# Patient Record
Sex: FEMALE | Race: WHITE
[De-identification: names, ages, dates, MRNs, and addresses within clinical notes are randomized per-mention and may not be internally consistent; named-entity substitution may affect disease eponyms.]

---

## 2017-02-09 ENCOUNTER — HOSPITAL ENCOUNTER (EMERGENCY)
Dept: HOSPITAL 17 - NEPB | Age: 37
Discharge: HOME | End: 2017-02-09
Payer: SELF-PAY

## 2017-02-09 VITALS
DIASTOLIC BLOOD PRESSURE: 72 MMHG | OXYGEN SATURATION: 99 % | HEART RATE: 86 BPM | SYSTOLIC BLOOD PRESSURE: 168 MMHG | RESPIRATION RATE: 14 BRPM | TEMPERATURE: 97.9 F

## 2017-02-09 VITALS — WEIGHT: 231.49 LBS | BODY MASS INDEX: 38.57 KG/M2 | HEIGHT: 65 IN

## 2017-02-09 DIAGNOSIS — L03.116: Primary | ICD-10-CM

## 2017-02-09 PROCEDURE — 96372 THER/PROPH/DIAG INJ SC/IM: CPT

## 2017-02-09 NOTE — PD
HPI


Chief Complaint:  Skin Problem


Time Seen by Provider:  16:40


Travel History


International Travel<30 days:  No


Contact w/Intl Traveler<30days:  No


Traveled to known affect area:  No





History of Present Illness


HPI


36-year-old female presents the emergency department with erythema and swelling 

over the left dorsal foot after receiving a tattoo the area 2 days ago.  

Patient is up-to-date on her tetanus as of last .  Patient denies fever, 

chills, or other symptoms.  She has no history of MRSA in the past.  She is 

allergic to Toradol.  Her pain is 6/10.





PFSH


Past Medical History


Arthritis:  No


Asthma:  No


Anxiety:  Yes


Heart Rhythm Problems:  No


Cardiac Catheterization:  No


Cardiovascular Problems:  No


High Cholesterol:  No


Chest Pain:  No


Congestive Heart Failure:  No


COPD:  No


Cerebrovascular Accident:  No


Diabetes:  No


Diminished Hearing:  No


Deep Vein Thrombosis:  Yes


Endocrine:  No


Gastrointestinal Disorders:  Yes (CELIAC DISEASE)


GERD:  Yes


Glaucoma:  No


Genitourinary:  No


Headaches:  No


Hepatitis:  Yes (HEP C)


Hiatal Hernia:  No


Hypertension:  No


Kidney Stones:  No


Musculoskeletal:  No


Neurologic:  No


Reproductive:  No


Respiratory:  No


Immunizations Current:  Yes


Myocardial Infarction:  No


Renal Failure:  No


Seizures:  No


Sleep Apnea:  No


Thyroid Disease:  No


Ulcer:  Yes


Pregnant?:  Not Pregnant


Menopausal:  No


:  2


Para:  2


Miscarriage:  0


Ovarian Cysts:  Yes (RIGHT )


Tubal Ligation:  Yes





Past Surgical History


AICD:  No


Cardiac Surgery:  No


Cholecystectomy:  Yes


Coronary Artery Bypass Graft:  No


Ear Surgery:  Yes


Endocrine Surgery:  No


Eye Surgery:  No


Genitourinary Surgery:  Yes (COLONOSCOPY )


Gynecologic Surgery:  Yes (TUBAL LIGATION)


Hysterectomy:  Yes


Oral Surgery:  No


Pacemaker:  No


Thoracic Surgery:  No


Tympanostomy Tube:  Yes


Other Surgery:  Yes (choleystestectomy)





Social History


Alcohol Use:  Yes (SOCIALLY)


Tobacco Use:  No


Substance Use:  No





Allergies-Medications


(Allergen,Severity, Reaction):  


Coded Allergies:  


     Toradol (Verified  Adverse Reaction, Severe, DIAPHORETIC, N/V, 17)


Reported Meds & Prescriptions





Reported Meds & Active Scripts


Active








Review of Systems


Except as stated in HPI:  all other systems reviewed are Neg


General / Constitutional:  No: Fever


Eyes:  No: Visual changes


HENT:  No: Headaches


Cardiovascular:  No: Chest Pain or Discomfort


Respiratory:  No: Shortness of Breath


Gastrointestinal:  No: Abdominal Pain


Genitourinary:  No: Dysuria


Musculoskeletal:  Positive: Myalgias,  No: Pain


Skin:  Positive Lesions (see history present illness.), No Rash


Neurologic:  No: Weakness


Psychiatric:  No: Depression


Endocrine:  No: Polydipsia


Hematologic/Lymphatic:  No: Easy Bruising





Physical Exam


Narrative


GENERAL: Patient is in mild to moderate distress.


SKIN: Warm and dry.  Patient has normal color.  Normal turgor.  Patient has a 

new tattoo to the left dorsal lateral foot with some follow with her dragonfly.

  This area is mildly erythematous with swelling to the dorsal foot without 

significant signs of drainage, or lymphangitis.  Left Toes are normal in 

appearance.  The left ankle is normal in appearance.


HEAD: Atraumatic. Normocephalic. 


EYES: Pupils equal and round. No scleral icterus. No injection or drainage. 


ENT: No nasal bleeding or discharge.  Mucous membranes pink and moist.  Pharynx 

is normal.


NECK: Trachea midline.  Supple nontender.


CARDIOVASCULAR: Regular rate and rhythm.  


RESPIRATORY: No accessory muscle use. Clear to auscultation. Breath sounds 

equal bilaterally. 


MUSCULOSKELETAL: Extremities without clubbing, cyanosis, or edema. No obvious 

deformities. SEE SKIN


NEUROLOGICAL: Awake and alert. No obvious cranial nerve deficits.  Motor 

grossly within normal limits. Five out of 5 muscle strength in the arms and 

legs.  Normal speech.


PSYCHIATRIC: Appropriate mood and affect; insight and judgment normal.





Data


Data


Last Documented VS





Vital Signs








  Date Time  Temp Pulse Resp B/P Pulse Ox O2 Delivery O2 Flow Rate FiO2


 


17 16:06 97.9 86 14 168/72 99 Room Air  








Orders





 Clindamycin Inj (Cleocin Inj) (17 16:45)








[facility]


Medical Decision Making


Medical Screen Exam Complete:  Yes


Emergency Medical Condition:  Yes


Differential Diagnosis


Cellulitis left foot.  Infected tattoo.  Early cellulitis.  Possible MRSA.


Narrative Course


Patient is medically stable at time of exam.


There is no real drainage to culture.


Patient is given 600 mg clindamycin IM.


Patient was discharged home on Bactrim DS twice a day 7 days.


Patient is given topical Bactroban to be applied to the area twice daily after 

cleansing with water and soap.


Patient is to keep the area elevated and take ibuprofen as needed for pain.


Patient is given a note for work.


Patient should follow with her primary care physician or return to emergency 

department worsening symptoms as needed.





Diagnosis





 Primary Impression:  


 Cellulitis of left foot excluding toes


Referrals:  


Primary Care Physician


Patient Instructions:  Cellulitis (ED), General Instructions


Departure Forms:  Work Release   Enter return to work date:  2017





***Additional Instructions:


Patient is given 600 mg clindamycin IM.


Patient was discharged home on Bactrim DS twice a day 7 days.


Patient is given topical Bactroban to be applied to the area twice daily after 

cleansing with water and soap.


Patient is to keep the area elevated and take ibuprofen as needed for pain.


Patient is given a note for work.


Patient should follow with her primary care physician or return to emergency 

department worsening symptoms as needed.


***Med/Other Pt SpecificInfo:  Prescription(s) given


Disposition:   DISCHARGE HOME


Condition:  Stable








Brayan Brown 2017 16:41

## 2017-07-23 ENCOUNTER — HOSPITAL ENCOUNTER (EMERGENCY)
Dept: HOSPITAL 17 - PHED | Age: 37
LOS: 1 days | Discharge: HOME | End: 2017-07-24
Payer: COMMERCIAL

## 2017-07-23 VITALS — WEIGHT: 246.26 LBS | BODY MASS INDEX: 41.03 KG/M2 | HEIGHT: 65 IN

## 2017-07-23 VITALS
RESPIRATION RATE: 17 BRPM | SYSTOLIC BLOOD PRESSURE: 129 MMHG | OXYGEN SATURATION: 100 % | DIASTOLIC BLOOD PRESSURE: 70 MMHG | HEART RATE: 90 BPM

## 2017-07-23 VITALS
SYSTOLIC BLOOD PRESSURE: 141 MMHG | HEART RATE: 79 BPM | RESPIRATION RATE: 20 BRPM | TEMPERATURE: 98.2 F | OXYGEN SATURATION: 100 % | DIASTOLIC BLOOD PRESSURE: 90 MMHG

## 2017-07-23 VITALS
DIASTOLIC BLOOD PRESSURE: 60 MMHG | HEART RATE: 65 BPM | OXYGEN SATURATION: 100 % | SYSTOLIC BLOOD PRESSURE: 102 MMHG | RESPIRATION RATE: 18 BRPM

## 2017-07-23 DIAGNOSIS — N93.9: ICD-10-CM

## 2017-07-23 DIAGNOSIS — Z86.718: ICD-10-CM

## 2017-07-23 DIAGNOSIS — R10.30: Primary | ICD-10-CM

## 2017-07-23 DIAGNOSIS — Z87.19: ICD-10-CM

## 2017-07-23 DIAGNOSIS — R11.2: ICD-10-CM

## 2017-07-23 DIAGNOSIS — Z86.59: ICD-10-CM

## 2017-07-23 LAB
ALP SERPL-CCNC: 127 U/L (ref 45–117)
ALT SERPL-CCNC: 28 U/L (ref 10–53)
ANION GAP SERPL CALC-SCNC: 8 MEQ/L (ref 5–15)
APTT BLD: 25.9 SEC (ref 24.3–30.1)
AST SERPL-CCNC: 24 U/L (ref 15–37)
BACTERIA #/AREA URNS HPF: (no result) /HPF
BASOPHILS # BLD AUTO: 0.1 TH/MM3 (ref 0–0.2)
BASOPHILS NFR BLD: 1.8 % (ref 0–2)
BILIRUB SERPL-MCNC: 0.2 MG/DL (ref 0.2–1)
BUN SERPL-MCNC: 11 MG/DL (ref 7–18)
CHLORIDE SERPL-SCNC: 106 MEQ/L (ref 98–107)
COLOR UR: YELLOW
COMMENT (UR): (no result)
CULTURE IF INDICATED: (no result)
EOSINOPHIL # BLD: 0.2 TH/MM3 (ref 0–0.4)
EOSINOPHIL NFR BLD: 2 % (ref 0–4)
ERYTHROCYTE [DISTWIDTH] IN BLOOD BY AUTOMATED COUNT: 15.4 % (ref 11.6–17.2)
GFR SERPLBLD BASED ON 1.73 SQ M-ARVRAT: 101 ML/MIN (ref 89–?)
GLUCOSE UR STRIP-MCNC: (no result) MG/DL
HCO3 BLD-SCNC: 24.8 MEQ/L (ref 21–32)
HCT VFR BLD CALC: 32.9 % (ref 35–46)
HEMO FLAGS: (no result)
HGB UR QL STRIP: (no result)
INR PPP: 1 RATIO
KETONES UR STRIP-MCNC: (no result) MG/DL
LEUKOCYTE ESTERASE UR QL STRIP: (no result) /HPF (ref 0–5)
LYMPHOCYTES # BLD AUTO: 2.8 TH/MM3 (ref 1–4.8)
LYMPHOCYTES NFR BLD AUTO: 34.3 % (ref 9–44)
MCH RBC QN AUTO: 24 PG (ref 27–34)
MCHC RBC AUTO-ENTMCNC: 32.2 % (ref 32–36)
MCV RBC AUTO: 74.5 FL (ref 80–100)
MONOCYTES NFR BLD: 8.8 % (ref 0–8)
MUCOUS THREADS #/AREA URNS LPF: (no result) /LPF
NEUTROPHILS # BLD AUTO: 4.3 TH/MM3 (ref 1.8–7.7)
NEUTROPHILS NFR BLD AUTO: 53.1 % (ref 16–70)
NITRITE UR QL STRIP: (no result)
PLAT MORPH BLD: NORMAL
PLATELET # BLD: 258 TH/MM3 (ref 150–450)
PLATELET BLD QL SMEAR: NORMAL
POTASSIUM SERPL-SCNC: 3.6 MEQ/L (ref 3.5–5.1)
PROTHROMBIN TIME: 10.7 SEC (ref 9.8–11.6)
RBC # BLD AUTO: 4.42 MIL/MM3 (ref 4–5.3)
RBC #/AREA URNS HPF: (no result) /HPF (ref 0–3)
SCAN/DIFF: (no result)
SODIUM SERPL-SCNC: 139 MEQ/L (ref 136–145)
SP GR UR STRIP: 1.02 (ref 1–1.03)
SQUAMOUS #/AREA URNS HPF: (no result) /HPF (ref 0–5)
WBC # BLD AUTO: 8.1 TH/MM3 (ref 4–11)

## 2017-07-23 PROCEDURE — 85730 THROMBOPLASTIN TIME PARTIAL: CPT

## 2017-07-23 PROCEDURE — 74176 CT ABD & PELVIS W/O CONTRAST: CPT

## 2017-07-23 PROCEDURE — 85610 PROTHROMBIN TIME: CPT

## 2017-07-23 PROCEDURE — 99285 EMERGENCY DEPT VISIT HI MDM: CPT

## 2017-07-23 PROCEDURE — 87591 N.GONORRHOEAE DNA AMP PROB: CPT

## 2017-07-23 PROCEDURE — 81001 URINALYSIS AUTO W/SCOPE: CPT

## 2017-07-23 PROCEDURE — 87491 CHLMYD TRACH DNA AMP PROBE: CPT

## 2017-07-23 PROCEDURE — 85025 COMPLETE CBC W/AUTO DIFF WBC: CPT

## 2017-07-23 PROCEDURE — 80053 COMPREHEN METABOLIC PANEL: CPT

## 2017-07-23 PROCEDURE — 87210 SMEAR WET MOUNT SALINE/INK: CPT

## 2017-07-23 PROCEDURE — 96372 THER/PROPH/DIAG INJ SC/IM: CPT

## 2017-07-23 PROCEDURE — 87086 URINE CULTURE/COLONY COUNT: CPT

## 2017-07-23 NOTE — RADRPT
EXAM DATE/TIME:  07/23/2017 22:52 

 

[facility] COMPARISON:     

CT PULMONARY ANGIOGRAM, May 01, 2016, 20:34.

 

 

INDICATIONS :     

Lower abdominal pain post hysterectomy in October 2016. Cramping for three days.

                  

 

ORAL CONTRAST:      

No oral contrast ingested.

                  

 

RADIATION DOSE:     

22.26 CTDIvol (mGy) ; Patient body habitus

 

 

MEDICAL HISTORY :     

Gastroesophageal reflux disease. Deep venous thrombosis. Celiac disease.

 

SURGICAL HISTORY :      

Cholecystectomy. Hysterectomy.

 

ENCOUNTER:      

Initial

 

ACUITY:      

7 - 11 months

 

PAIN SCALE:      

9/10

 

LOCATION:       

Bilateral lower quadrant 

 

TECHNIQUE:     

Volumetric scanning of the abdomen and pelvis was performed.  Using automated exposure control and ad
justment of the mA and/or kV according to patient size, radiation dose was kept as low as reasonably 
achievable to obtain optimal diagnostic quality images.  DICOM format image data is available electro
nically for review and comparison.  

 

FINDINGS:     

 

LOWER LUNGS:     

There is a subpleural 4 mm noncalcified pulmonary nodule in the right lower lobe. This nodule has bee
n stable since May 2016, diagnostic of a benign process. Otherwise, the visualized lower lungs are cl
ear.

 

LIVER:     

Homogeneous density without lesion.  There is no dilation of the biliary tree.  There is been prior c
holecystectomy clips in the gallbladder fossa.

 

SPLEEN:     

Normal size without lesion.

 

PANCREAS:     

Within normal limits. 

 

KIDNEYS:     

Normal in size and shape.  There is no mass, stone, or hydronephrosis.

 

ADRENAL GLANDS:     

Within normal limits.

 

VASCULAR:     

There is no aortic aneurysm.

 

BOWEL/MESENTERY:     

The stomach, small bowel, and colon demonstrate no acute abnormality.  There is no free intraperitone
al air or fluid. 

 

ABDOMINAL WALL:     

Within normal limits.

 

RETROPERITONEUM:     

There is no lymphadenopathy.

 

BLADDER:     

No wall thickening or mass.

 

REPRODUCTIVE:     

No abnormality is identified.

 

INGUINAL:     

There is no lymphadenopathy or hernia.

 

MUSCULOSKELETAL:     

No acute finding is identified.

 

CONCLUSION:     

No acute finding is identified within the abdomen or pelvis on this noncontrast examination.

 

 

 

 Arvin Lara MD on July 23, 2017 at 23:23           

Board Certified Radiologist.

 This report was verified electronically.

## 2017-07-23 NOTE — PD
HPI


Chief Complaint:  Abdominal Pain


Time Seen by Provider:  22:03


Travel History


International Travel<30 days:  No


Contact w/Intl Traveler<30days:  No


Traveled to known affect area:  No





History of Present Illness


HPI


37-year-old female complains of low abdominal pain and vaginal bleeding.  

Patient has history of enlarged uterus.  Patient states that she has recurrent 

low abdominal pain with irregular vaginal bleeding.  Patient status post 

hysterectomy in 2016 by Dr. Arnold.  Patient states that she has 

intermittent abdominal pain and vaginal bleeding since then even after surgery.

  Patient states that the pain is worse for the past 3 days.  Patient states 

that she has intermittent nausea vomiting with the pain.  Patient states that 

the pain in cramping pain and sharp pain localized to lower abdomen.  She 

denies any pain radiation.  Patient denies any fever chills.  Patient denies 

any dysuria or frequency.  Patient denies any back pain.  Patient was seen by 

personal physician and awaiting appointment with another gynecologist.  On a 

scale of 1-10 the pain is a 9.  Patient status post cholecystectomy also.





PFSH


Past Medical History


Arthritis:  No


Asthma:  No


Anxiety:  Yes


Heart Rhythm Problems:  No


Cardiac Catheterization:  No


Cardiovascular Problems:  No


High Cholesterol:  No


Chest Pain:  No


Congestive Heart Failure:  No


COPD:  No


Cerebrovascular Accident:  No


Diabetes:  No


Diminished Hearing:  No


Deep Vein Thrombosis:  Yes


Endocrine:  No


Gastrointestinal Disorders:  Yes (CELIAC DISEASE)


GERD:  Yes


Glaucoma:  No


Genitourinary:  No


Headaches:  No


Hepatitis:  Yes (HEP C)


Hiatal Hernia:  No


Hypertension:  No


Kidney Stones:  No


Musculoskeletal:  No


Neurologic:  No


Reproductive:  No


Respiratory:  No


Immunizations Current:  Yes


Myocardial Infarction:  No


Renal Failure:  No


Seizures:  No


Sleep Apnea:  No


Thyroid Disease:  No


Ulcer:  Yes


Pregnant?:  Not Pregnant


Menopausal:  No


:  2


Para:  2


Miscarriage:  0


Ovarian Cysts:  Yes (RIGHT )


Tubal Ligation:  Yes





Past Surgical History


AICD:  No


Cardiac Surgery:  No


Cholecystectomy:  Yes


Coronary Artery Bypass Graft:  No


Ear Surgery:  Yes


Endocrine Surgery:  No


Eye Surgery:  No


Genitourinary Surgery:  Yes (COLONOSCOPY )


Gynecologic Surgery:  Yes (TUBAL LIGATION)


Hysterectomy:  Yes (PARTIAL)


Oral Surgery:  No


Pacemaker:  No


Thoracic Surgery:  No


Tympanostomy Tube:  Yes


Other Surgery:  Yes (choleystestectomy)





Social History


Alcohol Use:  Yes (SOCIALLY)


Tobacco Use:  No


Substance Use:  No





Allergies-Medications


(Allergen,Severity, Reaction):  


Coded Allergies:  


     Toradol (Verified  Adverse Reaction, Severe, DIAPHORETIC, N/V, 17)


Reported Meds & Prescriptions





Reported Meds & Active Scripts


Active


Reported


Omeprazole 40 Mg Cap 80 Mg PO BID


Methimazole 10 Mg Tab 10 Mg PO TID


Metoprolol Tartrate 25 Mg Tab 12.5 Mg PO BID








Review of Systems


General / Constitutional:  No: Fever


Eyes:  No: Visual changes


HENT:  No: Headaches


Cardiovascular:  No: Chest Pain or Discomfort


Respiratory:  No: Shortness of Breath


Gastrointestinal:  Positive: Nausea, Vomiting, Abdominal Pain


Genitourinary:  No: Dysuria


Musculoskeletal:  No: Pain


Skin:  No Rash


Neurologic:  No: Weakness


Psychiatric:  No: Depression


Endocrine:  No: Polydipsia


Hematologic/Lymphatic:  No: Easy Bruising





Physical Exam


Narrative


GENERAL: Well-nourished, well-developed patient.


SKIN: Focused skin assessment warm/dry.


HEAD: Normocephalic.


EYES: No scleral icterus. No injection or drainage. 


NECK: Supple, trachea midline. No JVD or lymphadenopathy.


CARDIOVASCULAR: Regular rate and rhythm without murmurs, gallops, or rubs. 


RESPIRATORY: Breath sounds equal bilaterally. No accessory muscle use.


GASTROINTESTINAL: Abdomen soft,  nondistended.  Patient has mild to moderate 

tenderness on palpation lower abdomen.  No rebound tenderness.  No mass.


MUSCULOSKELETAL: No cyanosis, or edema. 


BACK: Nontender without obvious deformity. No CVA tenderness. 


GYN exam: The cervix is present.  Patient has a small amount a whitish 

discharge in the vaginal vault.  Mild to moderate tenderness on palpation right 

adnexal area.  No adnexal mass.





Data


Data


Last Documented VS





Vital Signs








  Date Time  Temp Pulse Resp B/P Pulse Ox O2 Delivery O2 Flow Rate FiO2


 


17 21:55 98.2 79 20 141/90 100   








Orders





 Complete Blood Count With Diff (17 22:12)


Comprehensive Metabolic Panel (17 22:12)


Prothrombin Time / Inr (Pt) (17 22:12)


Act Partial Throm Time (Ptt) (17 22:12)


Urinalysis - C+S If Indicated (17 22:12)


Iv Access Insert/Monitor (17 22:12)


Ecg Monitoring (17 22:12)


Oximetry (17 22:12)


Morphine Inj (Morphine Inj) (17 22:15)


Ondansetron Inj (Zofran Inj) (17 22:15)


Ct Abd/Pel W/O Iv Contrast (17 22:55)


Urine Culture (17 22:35)


Gc And Chlamydia Pcr (17 23:19)


Wet Prep Profile (17 23:19)


Morphine Inj (Morphine Inj) (17 23:30)


Ondansetron  Odt (Zofran  Odt) (17 23:30)





Labs





 Laboratory Tests








Test 17





 22:35 22:54


 


Urine Color YELLOW  


 


Urine Turbidity CLEAR  


 


Urine pH 5.5  


 


Urine Specific Gravity 1.018  


 


Urine Protein NEG mg/dL 


 


Urine Glucose (UA) NEG mg/dL 


 


Urine Ketones NEG mg/dL 


 


Urine Occult Blood NEG  


 


Urine Nitrite NEG  


 


Urine Bilirubin NEG  


 


Urine Leukocyte Esterase NEG  


 


Urine RBC 0-3 /hpf 


 


Urine WBC 0-2 /hpf 


 


Urine Squamous Epithelial 0-5 /hpf 





Cells  


 


Urine Bacteria MOD /hpf 


 


Urine Mucus OCC /lpf 


 


Microscopic Urinalysis Comment CULTURE 





 INDICATED 


 


Sodium Level 139 MEQ/L 


 


Potassium Level 3.6 MEQ/L 


 


Chloride Level 106 MEQ/L 


 


Carbon Dioxide Level 24.8 MEQ/L 


 


Anion Gap 8 MEQ/L 


 


Blood Urea Nitrogen 11 MG/DL 


 


Creatinine 0.65 MG/DL 


 


Estimat Glomerular Filtration 101 ML/MIN 





Rate  


 


Random Glucose 123 MG/DL 


 


Calcium Level 8.7 MG/DL 


 


Total Bilirubin 0.2 MG/DL 


 


Aspartate Amino Transf 24 U/L 





(AST/SGOT)  


 


Alanine Aminotransferase 28 U/L 





(ALT/SGPT)  


 


Alkaline Phosphatase 127 U/L 


 


Total Protein 7.5 GM/DL 


 


Albumin 3.4 GM/DL 


 


White Blood Count  8.1 TH/MM3


 


Red Blood Count  4.42 MIL/MM3


 


Hemoglobin  10.6 GM/DL


 


Hematocrit  32.9 %


 


Mean Corpuscular Volume  74.5 FL


 


Mean Corpuscular Hemoglobin  24.0 PG


 


Mean Corpuscular Hemoglobin  32.2 %





Concent  


 


Red Cell Distribution Width  15.4 %


 


Platelet Count  258 TH/MM3


 


Mean Platelet Volume  9.1 FL


 


Neutrophils (%) (Auto)  53.1 %


 


Lymphocytes (%) (Auto)  34.3 %


 


Monocytes (%) (Auto)  8.8 %


 


Eosinophils (%) (Auto)  2.0 %


 


Basophils (%) (Auto)  1.8 %


 


Neutrophils # (Auto)  4.3 TH/MM3


 


Lymphocytes # (Auto)  2.8 TH/MM3


 


Monocytes # (Auto)  0.7 TH/MM3


 


Eosinophils # (Auto)  0.2 TH/MM3


 


Basophils # (Auto)  0.1 TH/MM3


 


CBC Comment  AUTO DIFF 


 


Prothrombin Time  10.7 SEC


 


Prothromb Time International  1.0 RATIO





Ratio  


 


Activated Partial  25.9 SEC





Thromboplast Time  











MDM


Medical Decision Making


Medical Screen Exam Complete:  Yes


Emergency Medical Condition:  Yes


Interpretation(s)





Last Impressions








Abdomen/Pelvis CT 17 2255 Signed





Impressions: 





 Service Date/Time:  2017 22:52 - CONCLUSION:  No acute 

finding 





 is identified within the abdomen or pelvis on this noncontrast examination.   

  





 Arvin Lara MD 





23:49 PM.  CBC with hemoglobin 10.6 hematocrit 32.9.  MCV 74.5.  WBC 8.1 with 

normal differential.  CMP within normal limit.  UA is negative.


Differential Diagnosis


Differential diagnosis including UTI, pyelonephritis, nephrolithiasis, ovarian 

cyst, ovarian torsion, colitis


Narrative Course


37-year-old female with low abdominal pain.  Patient status post hysterectomy.  

Patient reported intermittent vaginal bleeding and low abdominal pain since 

2016 after the surgery.





Diagnosis





 Primary Impression:  


 Abdominal pain


 Qualified Code:  R10.30 - Lower abdominal pain


Patient Instructions:  General Instructions





***Additional Instructions:


Tramadol as needed for pain.  Follow-up with gynecologist and GI specialist if 

persistent problem.  Return if worse.


***Med/Other Pt SpecificInfo:  Prescription(s) given


Scripts


Tramadol (Ultram)50 Mg Tab50 Mg PO Q6H PRN (PAIN) #30 TAB


   Prov:Rodrigue Levy MD         17


Disposition:  01 DISCHARGE HOME


Condition:  Stable








Rodrigue Levy MD 2017 22:24

## 2017-07-24 LAB
CHLAMYDIA PCR: NOT DETECTED
NEISSERIA PCR: NOT DETECTED

## 2017-10-30 ENCOUNTER — HOSPITAL ENCOUNTER (EMERGENCY)
Dept: HOSPITAL 17 - NEPC | Age: 37
Discharge: HOME | End: 2017-10-30
Payer: COMMERCIAL

## 2017-10-30 VITALS
SYSTOLIC BLOOD PRESSURE: 141 MMHG | RESPIRATION RATE: 18 BRPM | HEART RATE: 86 BPM | DIASTOLIC BLOOD PRESSURE: 98 MMHG | TEMPERATURE: 97.9 F | OXYGEN SATURATION: 100 %

## 2017-10-30 VITALS
SYSTOLIC BLOOD PRESSURE: 116 MMHG | OXYGEN SATURATION: 100 % | HEART RATE: 70 BPM | DIASTOLIC BLOOD PRESSURE: 56 MMHG | RESPIRATION RATE: 20 BRPM

## 2017-10-30 VITALS
OXYGEN SATURATION: 100 % | RESPIRATION RATE: 18 BRPM | DIASTOLIC BLOOD PRESSURE: 69 MMHG | HEART RATE: 86 BPM | SYSTOLIC BLOOD PRESSURE: 108 MMHG

## 2017-10-30 VITALS — BODY MASS INDEX: 40.4 KG/M2 | WEIGHT: 242.51 LBS | HEIGHT: 65 IN

## 2017-10-30 DIAGNOSIS — E05.90: ICD-10-CM

## 2017-10-30 DIAGNOSIS — K90.0: ICD-10-CM

## 2017-10-30 DIAGNOSIS — F41.9: ICD-10-CM

## 2017-10-30 DIAGNOSIS — B19.20: ICD-10-CM

## 2017-10-30 DIAGNOSIS — R11.2: ICD-10-CM

## 2017-10-30 DIAGNOSIS — K21.9: ICD-10-CM

## 2017-10-30 DIAGNOSIS — R10.33: Primary | ICD-10-CM

## 2017-10-30 DIAGNOSIS — E05.00: ICD-10-CM

## 2017-10-30 DIAGNOSIS — Z86.718: ICD-10-CM

## 2017-10-30 LAB
ALP SERPL-CCNC: 91 U/L (ref 45–117)
ALT SERPL-CCNC: 20 U/L (ref 10–53)
ANION GAP SERPL CALC-SCNC: 8 MEQ/L (ref 5–15)
APTT BLD: 26 SEC (ref 24.3–30.1)
AST SERPL-CCNC: 19 U/L (ref 15–37)
BASOPHILS # BLD AUTO: 0 TH/MM3 (ref 0–0.2)
BASOPHILS NFR BLD: 0.6 % (ref 0–2)
BILIRUB SERPL-MCNC: 0.5 MG/DL (ref 0.2–1)
BUN SERPL-MCNC: 9 MG/DL (ref 7–18)
CHLORIDE SERPL-SCNC: 108 MEQ/L (ref 98–107)
COLOR UR: YELLOW
COMMENT (UR): (no result)
CULTURE IF INDICATED: (no result)
EOSINOPHIL # BLD: 0.1 TH/MM3 (ref 0–0.4)
EOSINOPHIL NFR BLD: 2.1 % (ref 0–4)
ERYTHROCYTE [DISTWIDTH] IN BLOOD BY AUTOMATED COUNT: 15.3 % (ref 11.6–17.2)
GFR SERPLBLD BASED ON 1.73 SQ M-ARVRAT: 116 ML/MIN (ref 89–?)
GLUCOSE UR STRIP-MCNC: (no result) MG/DL
HCO3 BLD-SCNC: 22.7 MEQ/L (ref 21–32)
HCT VFR BLD CALC: 33.5 % (ref 35–46)
HEMO FLAGS: (no result)
HGB UR QL STRIP: (no result)
INR PPP: 1 RATIO
KETONES UR STRIP-MCNC: 10 MG/DL
LYMPHOCYTES # BLD AUTO: 2.1 TH/MM3 (ref 1–4.8)
LYMPHOCYTES NFR BLD AUTO: 29.9 % (ref 9–44)
MCH RBC QN AUTO: 24.5 PG (ref 27–34)
MCHC RBC AUTO-ENTMCNC: 32.2 % (ref 32–36)
MCV RBC AUTO: 76 FL (ref 80–100)
MONOCYTES NFR BLD: 7.9 % (ref 0–8)
NEUTROPHILS # BLD AUTO: 4.2 TH/MM3 (ref 1.8–7.7)
NEUTROPHILS NFR BLD AUTO: 59.5 % (ref 16–70)
NITRITE UR QL STRIP: (no result)
PLATELET # BLD: 297 TH/MM3 (ref 150–450)
POTASSIUM SERPL-SCNC: 3.6 MEQ/L (ref 3.5–5.1)
PROTHROMBIN TIME: 11 SEC (ref 9.8–11.6)
RBC # BLD AUTO: 4.41 MIL/MM3 (ref 4–5.3)
SODIUM SERPL-SCNC: 139 MEQ/L (ref 136–145)
SP GR UR STRIP: 1.05 (ref 1–1.03)
SQUAMOUS #/AREA URNS HPF: 3 /HPF (ref 0–5)
WBC # BLD AUTO: 7.1 TH/MM3 (ref 4–11)

## 2017-10-30 PROCEDURE — 85730 THROMBOPLASTIN TIME PARTIAL: CPT

## 2017-10-30 PROCEDURE — 80053 COMPREHEN METABOLIC PANEL: CPT

## 2017-10-30 PROCEDURE — 96376 TX/PRO/DX INJ SAME DRUG ADON: CPT

## 2017-10-30 PROCEDURE — 85025 COMPLETE CBC W/AUTO DIFF WBC: CPT

## 2017-10-30 PROCEDURE — 96361 HYDRATE IV INFUSION ADD-ON: CPT

## 2017-10-30 PROCEDURE — 81001 URINALYSIS AUTO W/SCOPE: CPT

## 2017-10-30 PROCEDURE — 85610 PROTHROMBIN TIME: CPT

## 2017-10-30 PROCEDURE — 96375 TX/PRO/DX INJ NEW DRUG ADDON: CPT

## 2017-10-30 PROCEDURE — 74177 CT ABD & PELVIS W/CONTRAST: CPT

## 2017-10-30 PROCEDURE — 99285 EMERGENCY DEPT VISIT HI MDM: CPT

## 2017-10-30 PROCEDURE — 96374 THER/PROPH/DIAG INJ IV PUSH: CPT

## 2017-10-30 PROCEDURE — 83690 ASSAY OF LIPASE: CPT

## 2017-10-30 NOTE — PD
Physical Exam


Date Seen by Provider:  Oct 30, 2017


Time Seen by Provider:  12:34


Narrative


37-year-old white female presents to emergency Department with complaints of 

nausea, vomiting and abdominal pain.  Symptoms have been present since this 

morning.  He is symptoms are worse with movement.  Associated nausea, vomiting 

and diarrhea.  No fever or chills.  No urinary symptoms.  No vaginal 

complaints.  Patient reports her pain at 10/10.  Sharp and cramping.





Vital signs reviewed. Pt. waiting for bed placement.





Data


Data


Last Documented VS





Vital Signs








  Date Time  Temp Pulse Resp B/P (MAP) Pulse Ox O2 Delivery O2 Flow Rate FiO2


 


10/30/17 12:29 97.9 86 18 141/98 (112) 100 Room Air  











[facility]


Medical Record Reviewed:  No


Supervised Visit with MIKAYLA:  Nilton Bustos Oct 30, 2017 12:37

## 2017-10-30 NOTE — PD
HPI


Chief Complaint:  Abdominal Pain


Time Seen by Provider:  12:45


Travel History


International Travel<30 days:  No


Contact w/Intl Traveler<30days:  No


Traveled to known affect area:  No





History of Present Illness


HPI


This patient was examined in the presence of a female nurse.  37-year-old 

female presents for evaluation of abdominal pain.  She reports over the past 3 

days she has had 3 episodes daily of loose stools.  She reports some nausea and 

vomiting since yesterday evening.  This morning she developed some abdominal 

pain.  She describes as a sharp pain in the periumbilical several radiates into 

the lower quadrants of the abdomen.  The pain is constant but worse with 

movement.  She denies fevers or chills.  She does endorse some pain in the 

right lower quadrant when she urinates.  She denies vaginal bleeding or 

discharge, fevers or chills, flank pain, chest pain or shortness of breath.  

She reports a history of partial hysterectomy as well as cholecystectomy.  She 

also reports a history of hepatitis C, celiac disease and Graves' disease.  Her 

primary care physician is .  No other complaints.





PFSH


Past Medical History


Arthritis:  No


Asthma:  No


Anxiety:  Yes


Heart Rhythm Problems:  No


Cardiac Catheterization:  No


Cardiovascular Problems:  No


High Cholesterol:  No


Chest Pain:  No


Congestive Heart Failure:  No


COPD:  No


Cerebrovascular Accident:  No


Diabetes:  No


Diminished Hearing:  No


Deep Vein Thrombosis:  Yes


Endocrine:  No


Gastrointestinal Disorders:  Yes (CELIAC DISEASE)


GERD:  Yes


Glaucoma:  No


Genitourinary:  No


Headaches:  No


Hepatitis:  Yes (HEP C)


Hiatal Hernia:  No


Hypertension:  No


Kidney Stones:  No


Musculoskeletal:  No


Neurologic:  No


Reproductive:  No


Respiratory:  No


Immunizations Current:  Yes


Myocardial Infarction:  No


Renal Failure:  No


Seizures:  No


Sleep Apnea:  No


Thyroid Disease:  Yes (Hyperthyroid)


Ulcer:  Yes


Tetanus Vaccination:  < 5 Years


Influenza Vaccination:  Yes


Pregnant?:  Not Pregnant


Menopausal:  No


:  2


Para:  2


Miscarriage:  0


Ovarian Cysts:  Yes (RIGHT )


Tubal Ligation:  Yes





Past Surgical History


AICD:  No


Cardiac Surgery:  No


Cholecystectomy:  Yes


Coronary Artery Bypass Graft:  No


Ear Surgery:  Yes


Endocrine Surgery:  No


Eye Surgery:  No


Genitourinary Surgery:  Yes (COLONOSCOPY )


Gynecologic Surgery:  Yes (TUBAL LIGATION)


Hysterectomy:  Yes (partial)


Oral Surgery:  No


Pacemaker:  No


Thoracic Surgery:  No


Tympanostomy Tube:  Yes


Other Surgery:  Yes (choleystestectomy)





Family History


Family Myocardial Infarction:  Yes (GRANDMOTHER IN HER 50S)





Social History


Alcohol Use:  Yes (SOCIALLY)


Tobacco Use:  No


Substance Use:  No





Allergies-Medications


(Allergen,Severity, Reaction):  


Coded Allergies:  


     ketorolac (Verified  Adverse Reaction, Severe, DIAPHORETIC, N/V, 10/30/17)


Reported Meds & Prescriptions





Reported Meds & Active Scripts


Active


Zofran (Ondansetron HCl) 4 Mg Tab 4 Mg PO Q6HR PRN


Bentyl (Dicyclomine HCl) 10 Mg Cap 10 Mg PO TID


Proair Hfa 8.5 GM Inh (Albuterol Sulfate) 90 Mcg/Act Aer 2 Puff INH Q4-6H PRN


     108 mcg/actuation


Reported


Omeprazole 40 Mg Cap 80 Mg PO BID


Metoprolol Tartrate 25 Mg Tab 12.5 Mg PO BID








Review of Systems


Except as stated in HPI:  all other systems reviewed are Neg





Physical Exam


Narrative


GENERAL: Well-developed well-nourished female who appears uncomfortable on 

initial examination


SKIN: Warm and dry.


HEAD: Atraumatic. Normocephalic. 


EYES: Pupils equal and round. No scleral icterus. No injection or drainage. 


ENT: No nasal bleeding or discharge.  Mucous membranes pink and moist.


NECK: Trachea midline. No JVD. 


CARDIOVASCULAR: Regular rate and rhythm.  No murmur appreciated.


RESPIRATORY: No accessory muscle use. Clear to auscultation. Breath sounds 

equal bilaterally. 


GASTROINTESTINAL: Abdomen soft, generalized tenderness to palpation in the 

lower quadrants without guarding.  There is no CVA tenderness.


MUSCULOSKELETAL: No obvious deformities. No clubbing.  No cyanosis.  No edema. 


NEUROLOGICAL: Awake and alert. No obvious cranial nerve deficits.  Motor 

grossly within normal limits. Normal speech.


PSYCHIATRIC: Appropriate mood and affect; insight and judgment normal.





Data


Data


Last Documented VS





Vital Signs








  Date Time  Temp Pulse Resp B/P (MAP) Pulse Ox O2 Delivery O2 Flow Rate FiO2


 


10/30/17 15:22  86 18 108/69 (82) 100 Room Air  


 


10/30/17 12:29 97.9       








Orders





 Orders


Complete Blood Count With Diff (10/30/17 12:51)


Comprehensive Metabolic Panel (10/30/17 12:51)


Lipase (10/30/17 12:51)


Prothrombin Time / Inr (Pt) (10/30/17 12:51)


Act Partial Throm Time (Ptt) (10/30/17 12:51)


Urinalysis - C+S If Indicated (10/30/17 12:51)


Ct Abd/Pel W Iv Contrast(Rout) (10/30/17 12:51)


Iv Access Insert/Monitor (10/30/17 12:51)


Ecg Monitoring (10/30/17 12:51)


Oximetry (10/30/17 12:51)


Morphine Inj (Morphine Inj) (10/30/17 13:00)


Ondansetron Inj (Zofran Inj) (10/30/17 13:00)


Sodium Chlor 0.9% 1000 Ml Inj (Ns 1000 M (10/30/17 12:51)


Sodium Chloride 0.9% Flush (Ns Flush) (10/30/17 13:00)


Vascular Access Team Consult/P PRN (10/30/17 13:01)


Vascular Poc Ultrasound (10/30/17 )


Morphine Inj (Morphine Inj) (10/30/17 14:15)


Iohexol 350 Inj (Omnipaque 350 Inj) (10/30/17 14:23)


Ed Discharge Order (10/30/17 15:48)





Labs





Laboratory Tests








Test


  10/30/17


13:10 10/30/17


13:30


 


Urine Color YELLOW  


 


Urine Turbidity CLEAR  


 


Urine pH 8.0  


 


Urine Specific Gravity 1.050  


 


Urine Protein NEG mg/dL  


 


Urine Glucose (UA) NEG mg/dL  


 


Urine Ketones 10 mg/dL  


 


Urine Occult Blood NEG  


 


Urine Nitrite NEG  


 


Urine Bilirubin NEG  


 


Urine Urobilinogen


  LESS THAN 2.0


MG/DL 


 


 


Urine Leukocyte Esterase NEG  


 


Urine RBC 1 /hpf  


 


Urine WBC


  LESS THAN 1


/hpf 


 


 


Urine Squamous Epithelial


Cells 3 /hpf 


  


 


 


Microscopic Urinalysis Comment


  CULT NOT


INDICATED 


 


 


White Blood Count  7.1 TH/MM3 


 


Red Blood Count  4.41 MIL/MM3 


 


Hemoglobin  10.8 GM/DL 


 


Hematocrit  33.5 % 


 


Mean Corpuscular Volume  76.0 FL 


 


Mean Corpuscular Hemoglobin  24.5 PG 


 


Mean Corpuscular Hemoglobin


Concent 


  32.2 % 


 


 


Red Cell Distribution Width  15.3 % 


 


Platelet Count  297 TH/MM3 


 


Mean Platelet Volume  8.4 FL 


 


Neutrophils (%) (Auto)  59.5 % 


 


Lymphocytes (%) (Auto)  29.9 % 


 


Monocytes (%) (Auto)  7.9 % 


 


Eosinophils (%) (Auto)  2.1 % 


 


Basophils (%) (Auto)  0.6 % 


 


Neutrophils # (Auto)  4.2 TH/MM3 


 


Lymphocytes # (Auto)  2.1 TH/MM3 


 


Monocytes # (Auto)  0.6 TH/MM3 


 


Eosinophils # (Auto)  0.1 TH/MM3 


 


Basophils # (Auto)  0.0 TH/MM3 


 


CBC Comment  DIFF FINAL 


 


Differential Comment   


 


Prothrombin Time  11.0 SEC 


 


Prothromb Time International


Ratio 


  1.0 RATIO 


 


 


Activated Partial


Thromboplast Time 


  26.0 SEC 


 


 


Blood Urea Nitrogen  9 MG/DL 


 


Creatinine  0.58 MG/DL 


 


Random Glucose  90 MG/DL 


 


Total Protein  7.1 GM/DL 


 


Albumin  3.1 GM/DL 


 


Calcium Level  8.4 MG/DL 


 


Alkaline Phosphatase  91 U/L 


 


Aspartate Amino Transf


(AST/SGOT) 


  19 U/L 


 


 


Alanine Aminotransferase


(ALT/SGPT) 


  20 U/L 


 


 


Total Bilirubin  0.5 MG/DL 


 


Sodium Level  139 MEQ/L 


 


Potassium Level  3.6 MEQ/L 


 


Chloride Level  108 MEQ/L 


 


Carbon Dioxide Level  22.7 MEQ/L 


 


Anion Gap  8 MEQ/L 


 


Estimat Glomerular Filtration


Rate 


  116 ML/MIN 


 


 


Lipase  149 U/L 











[facility]


Medical Decision Making


Medical Screen Exam Complete:  Yes


Emergency Medical Condition:  Yes


Medical Record Reviewed:  Yes


Interpretation(s)


CBC


CMP


Lipase


Urinalysis


CT abdomen and pelvis 


CONCLUSION:     


1. Normal appendix.


2. No acute CT abnormality in the abdomen or pelvis to explain patient's 

symptoms.


3. Stable 5 mm groundglass nodule in the peripheral right lower lobe no. No 

additional followup is recommended per 2017 Fleischner criteria.


Differential Diagnosis


Gastroenteritis, appendicitis, obstruction, colitis, diverticulitis, ovarian 

torsion


Narrative Course


The patient was placed on ECG monitoring pulse oximetry.  She will be given IV 

fluids, morphine and Zofran.  Plan is for lab work, urinalysis, CT abdomen and 

pelvis.


The patient's laboratory imaging studies revealed no acute abnormalities.  At 

this point in time the plan to be to discharge the patient with Zofran and 

Bentyl and have her follow-up with gastroenterology for more of an outpatient 

workup if symptoms persist.  She is agreeable.





Diagnosis





 Primary Impression:  


 Abdominal pain


 Qualified Codes:  R10.9 - Unspecified abdominal pain





***Additional Instructions:  


Medication as prescribed.  Stay well hydrated well-nourished.  Follow-up with 

primary care physician, gastroenterologist.  Return for any emergent medical 

conditions.


***Med/Other Pt SpecificInfo:  Prescription(s) given


Scripts


Ondansetron (Zofran) 4 Mg Tab


4 MG PO Q6HR Y for NAUSEA OR VOMITING, #20 TAB 0 Refills


   Prov: Aramis Knight MD         10/30/17 


Dicyclomine (Bentyl) 10 Mg Cap


10 MG PO TID for Bowel Management, #20 CAP 0 Refills


   Prov: Aramis Knight MD         10/30/17


Disposition:  01 DISCHARGE HOME


Condition:  Stable











Krish Waite Oct 30, 2017 12:55

## 2017-10-30 NOTE — RADRPT
EXAM DATE/TIME:  10/30/2017 14:20 

 

[facility] COMPARISON:     

CT ABDOMEN & PELVIS W CONTRAST, August 21, 2013, 0:46.  CT ABDOMEN & PELVIS W/O CONTRAST, July 23, 20
17, 22:52.

 

 

INDICATIONS :     

Right sided and mid lower abdominal pain since 5am.

                      

 

IV CONTRAST:     

93 cc Omnipaque 350 (iohexol) IV 

 

 

ORAL CONTRAST:      

No oral contrast ingested.

                      

 

RADIATION DOSE:     

16.82 CTDIvol (mGy) 

 

 

MEDICAL HISTORY :     

Ulcers.  

 

SURGICAL HISTORY :      

Cholecystectomy. Tubal ligation.Hysterectomy.

 

ENCOUNTER:      

Initial

 

ACUITY:      

1 day

 

PAIN SCALE:      

6/10

 

LOCATION:       

Right  abdomen

 

TECHNIQUE:     

Volumetric scanning of the abdomen and pelvis was performed.  Using automated exposure control and ad
justment of the mA and/or kV according to patient size, radiation dose was kept as low as reasonably 
achievable to obtain optimal diagnostic quality images.  DICOM format image data is available electro
nically for review and comparison.  

 

FINDINGS:     

 

LOWER LUNGS:     

5 mm sub-solid nodule in the posterior right lower lobe. This has been stable since 2013 exam. 

 

LIVER:     

Homogeneous density without lesion.  There is no dilation of the biliary tree. Gallbladder is surgica
lly absent.

 

SPLEEN:     

Normal size without lesion.

 

PANCREAS:     

Within normal limits.

 

KIDNEYS:     

Normal in size and shape.  There is no mass, stone or hydronephrosis.

 

ADRENAL GLANDS:     

Within normal limits.

 

VASCULAR:     

There is no aortic aneurysm.

 

BOWEL/MESENTERY:     

The stomach, small bowel, and colon demonstrate no acute abnormality.  Appendix is visualized and nor
mal in appearance. There is no free intraperitoneal air or fluid.

 

ABDOMINAL WALL:     

Within normal limits.

 

RETROPERITONEUM:     

Unremarkable.

 

BLADDER:     

No wall thickening or mass. 

 

REPRODUCTIVE:     

Within normal limits.

 

INGUINAL:     

There is no lymphadenopathy or hernia. 

 

MUSCULOSKELETAL:     

Within normal limits for patient age. 

 

CONCLUSION:     

1. Normal appendix.

2. No acute CT abnormality in the abdomen or pelvis to explain patient's symptoms.

3. Stable 5 mm groundglass nodule in the peripheral right lower lobe no. No additional followup is re
commended per 2017 Fleischner criteria.

 

 

 

 Donte Alexander MD on October 30, 2017 at 15:17           

Board Certified Radiologist.

 This report was verified electronically.

## 2018-05-28 ENCOUNTER — HOSPITAL ENCOUNTER (EMERGENCY)
Dept: HOSPITAL 17 - PHEFT | Age: 38
Discharge: HOME | End: 2018-05-28
Payer: COMMERCIAL

## 2018-05-28 VITALS — WEIGHT: 262.35 LBS | HEIGHT: 65 IN | BODY MASS INDEX: 43.71 KG/M2

## 2018-05-28 VITALS
TEMPERATURE: 97.9 F | OXYGEN SATURATION: 100 % | DIASTOLIC BLOOD PRESSURE: 90 MMHG | SYSTOLIC BLOOD PRESSURE: 147 MMHG | HEART RATE: 66 BPM | RESPIRATION RATE: 18 BRPM

## 2018-05-28 DIAGNOSIS — I10: ICD-10-CM

## 2018-05-28 DIAGNOSIS — K90.0: ICD-10-CM

## 2018-05-28 DIAGNOSIS — Z86.718: ICD-10-CM

## 2018-05-28 DIAGNOSIS — M26.622: Primary | ICD-10-CM

## 2018-05-28 DIAGNOSIS — E05.90: ICD-10-CM

## 2018-05-28 DIAGNOSIS — F41.9: ICD-10-CM

## 2018-05-28 DIAGNOSIS — K21.9: ICD-10-CM

## 2018-05-28 DIAGNOSIS — B19.20: ICD-10-CM

## 2018-05-28 PROCEDURE — 99283 EMERGENCY DEPT VISIT LOW MDM: CPT

## 2018-05-28 NOTE — PD
HPI


.


Ear pain


Chief Complaint:  ENT Complaint


Time Seen by Provider:  19:45


Travel History


International Travel<30 days:  No


Contact w/Intl Traveler<30days:  No


Traveled to known affect area:  No





History of Present Illness


HPI


Patient presents with chief complaint of left ear pain.  Onset 1 week ago.  

Getting progressively worse.  Currently rated 8/10.  Unrelieved by Tylenol and 

Advil.  Pain radiates to the head and neck on the left-hand side.





PFSH


Past Medical History


Arthritis:  No


Asthma:  No


Anxiety:  Yes


Heart Rhythm Problems:  No


Cardiac Catheterization:  No


Cardiovascular Problems:  No


High Cholesterol:  No


Chest Pain:  No


Congestive Heart Failure:  No


COPD:  No


Cerebrovascular Accident:  No


Diabetes:  No


Diminished Hearing:  No


Deep Vein Thrombosis:  Yes


Endocrine:  No


Gastrointestinal Disorders:  Yes (CELIAC DISEASE)


GERD:  Yes


Glaucoma:  No


Genitourinary:  No


Headaches:  No


Hepatitis:  Yes (HEP C)


Hiatal Hernia:  No


Hypertension:  Yes


Kidney Stones:  No


Musculoskeletal:  No


Neurologic:  No


Reproductive:  No


Respiratory:  No


Immunizations Current:  Yes


Myocardial Infarction:  No


Renal Failure:  No


Seizures:  No


Sleep Apnea:  No


Thyroid Disease:  Yes (Hyperthyroid)


Ulcer:  Yes


Tetanus Vaccination:  < 5 Years


Influenza Vaccination:  Yes


Pregnant?:  Not Pregnant


LMP:  18


Menopausal:  No


:  2


Para:  2


Miscarriage:  0


Ovarian Cysts:  Yes (RIGHT )


Tubal Ligation:  Yes





Past Surgical History


AICD:  No


Cardiac Surgery:  No


Cholecystectomy:  Yes


Coronary Artery Bypass Graft:  No


Ear Surgery:  Yes


Endocrine Surgery:  No


Eye Surgery:  No


Genitourinary Surgery:  Yes (COLONOSCOPY )


Gynecologic Surgery:  Yes (TUBAL LIGATION)


Hysterectomy:  Yes (partial)


Oral Surgery:  No


Pacemaker:  No


Thoracic Surgery:  No


Tympanostomy Tube:  Yes


Other Surgery:  Yes (choleystestectomy)





Family History


Family Myocardial Infarction:  Yes (GRANDMOTHER IN HER 50S)





Social History


Alcohol Use:  Yes (SOCIALLY)


Tobacco Use:  No


Substance Use:  No





Allergies-Medications


(Allergen,Severity, Reaction):  


Coded Allergies:  


     ketorolac (Verified  Adverse Reaction, Severe, DIAPHORETIC, N/V, 18)


Reported Meds & Prescriptions





Reported Meds & Active Scripts


Active


Nabumetone 500 Mg Tab 500 Mg PO BID


Reported


Omeprazole 40 Mg Cap 80 Mg PO BID


Metoprolol Tartrate 25 Mg Tab 12.5 Mg PO BID








Review of Systems


Except as stated in HPI:  all other systems reviewed are Neg





Physical Exam


Narrative


GENERAL: Awake and alert and in no acute distress.


SKIN: Warm and dry.


HEAD: Normocephalic/atraumatic.


EYES: Pupils are equal.  Extraocular movements are intact.


ENT: TMs are shiny gray with good light reflexes bilaterally.  There is no 

swelling of the auricle and no pain on movement of the auricle or compression 

of the tragus.  She does have tenderness to palpation of the left TMJ and the 

pain is exacerbated by opening and closing her mouth.


NECK: Normal range of motion.  No cervical lymphadenopathy.


CARDIOVASCULAR: Regular rate and rhythm.


RESPIRATORY: Nonlabored respirations.


MUSCULOSKELETAL: Atraumatic.


NEUROLOGICAL: Nonfocal.


PSYCHIATRIC: Appropriate mood and affect.





Data


Data


Last Documented VS





Vital Signs








  Date Time  Temp Pulse Resp B/P (MAP) Pulse Ox O2 Delivery O2 Flow Rate FiO2


 


18 19:17 97.9 66 18 147/90 (109) 100   








Orders





 Orders


Ed Discharge Order (18 19:51)








MDM


Medical Decision Making


Medical Screen Exam Complete:  Yes


Emergency Medical Condition:  Yes


Differential Diagnosis


Differential diagnosis of ear pain includes eustachian tube dysfunction, otitis 

externa, otitis media, TMJ syndrome


Narrative Course


This patient presents with a one-week history of left ear pain.  She has TMJ 

syndrome based upon physical exam.  She will be discharged home with a 

prescription for Relafen.  Follow-up with a dentist if symptoms persist.





Diagnosis





 Primary Impression:  


 TMJ arthralgia


 Qualified Codes:  M26.622 - Arthralgia of left temporomandibular joint


Patient Instructions:  General Instructions, Temporomandibular Disorder (ED)


Departure Forms:  Tests/Procedures





***Additional Instructions:  


Follow-up with a dentist if your symptoms persist.


Scripts


Nabumetone (Nabumetone) 500 Mg Tab


500 MG PO BID for Pain-Inflammation, #60 TAB 0 Refills


   Prov: Liza Floyd MD         18


Disposition:  01 DISCHARGE HOME


Condition:  Stable











Liza Floyd MD May 28, 2018 19:54